# Patient Record
(demographics unavailable — no encounter records)

---

## 2017-11-30 NOTE — RADRPT
EXAM DATE/TIME:  11/30/2017 17:14 

 

[facility] COMPARISON:     

No previous studies available for comparison.

 

                     

INDICATIONS :     

Chest pain

                     

 

MEDICAL HISTORY :     

None.          

 

SURGICAL HISTORY :     

None.   

 

ENCOUNTER:     

Initial                                        

 

ACUITY:     

3 days      

 

PAIN SCORE:     

0/10

 

LOCATION:      

chest 

 

FINDINGS:     

A single view of the chest demonstrates the lungs to be symmetrically aerated without evidence of mas
s, infiltrate or effusion.  The cardiomediastinal contours are unremarkable.  Osseous structures are 
intact.

 

CONCLUSION:     No acute disease.  

 

 

 

 Palomo Pardo MD on November 30, 2017 at 17:29           

Board Certified Radiologist.

 This report was verified electronically.

## 2017-11-30 NOTE — PD
HPI


Chief Complaint:  Chest Pain


Time Seen by Provider:  17:01


Travel History


International Travel<30 days:  No


Contact w/Intl Traveler<30days:  No


Traveled to known affect area:  No





History of Present Illness


HPI


Patient is a 20-year-old male who presents to emergency room for evaluation of 

chest pain.  Patient reports that he has been having intermittent episodes of 

chest pain which has been ongoing for the past few months, patient reports that 

when he has these symptoms, chest pain is located under his left breast, 

reports that symptoms feel like sharp and stabbing in nature.  Patient with no 

diaphoresis or shortness of breath with symptoms.  Patient reports that nothing 

makes pain better or worse.  Patient reports that he currently does not have 

any chest pain, reports that his girlfriend made him come to the emergency room 

for evaluation.  Patient denies any history of hypertension, hyperlipidemia, 

coronary artery disease or diabetes.  He is a nonsmoker.  Denies use of drugs 

or alcohol.  Patient reports that he feels fine at this time, reports no chest 

pain but just wanted to be evaluated.





PFSH


Past Medical History


Asthma:  Yes


Diminished Hearing:  No


Respiratory:  Yes (asthma as child)


Tetanus Vaccination:  < 5 Years


Influenza Vaccination:  No





Past Surgical History


Surgical History:  No Previous Surgery





Social History


Alcohol Use:  No


Tobacco Use:  No


Substance Use:  No





Allergies-Medications


(Allergen,Severity, Reaction):  


Coded Allergies:  


     amoxicillin (Verified  Allergy, Unknown, swollen, 11/30/17)


     clavulanic acid (Verified  Allergy, Unknown, swollen, 11/30/17)


Reported Meds & Prescriptions





Reported Meds & Active Scripts


Active


No Active Prescriptions or Reported Medications    








Review of Systems


General / Constitutional:  No: Fever


Eyes:  No: Visual changes


HENT:  No: Headaches


Cardiovascular:  Positive: Chest Pain or Discomfort, No: Palpitations, 

Irregular Rhythm, Tachycardia, Diaphoresis, Syncope, Dyspnea on exertion, 

Varicosities, Edema, Cyanosis


Respiratory:  No: Shortness of Breath


Gastrointestinal:  No: Abdominal Pain


Genitourinary:  No: Dysuria


Musculoskeletal:  No: Pain


Skin:  No Rash


Neurologic:  No: Weakness


Psychiatric:  No: Depression


Endocrine:  No: Polydipsia


Hematologic/Lymphatic:  No: Easy Bruising





Physical Exam


Narrative


GENERAL: NAD


SKIN: Focused skin assessment warm/dry.


HEAD: Atraumatic. Normocephalic. 


EYES: Pupils equal and round. No scleral icterus. No injection or drainage. 


ENT: No nasal bleeding or discharge.  Mucous membranes pink and moist.


NECK: Trachea midline. No JVD. 


CARDIOVASCULAR: Regular rate and rhythm.  No murmur appreciated.


RESPIRATORY: No accessory muscle use. Clear to auscultation. Breath sounds 

equal bilaterally. 


GASTROINTESTINAL: Abdomen soft, non-tender, nondistended. Hepatic and splenic 

margins not palpable. 


MUSCULOSKELETAL: No obvious deformities. No clubbing.  No cyanosis.  No edema. 


NEUROLOGICAL: Awake and alert. No obvious cranial nerve deficits.  Motor 

grossly within normal limits. Normal speech.


PSYCHIATRIC: Appropriate mood and affect; insight and judgment normal.





Data


Data


Last Documented VS





Vital Signs








  Date Time  Temp Pulse Resp B/P (MAP) Pulse Ox O2 Delivery O2 Flow Rate FiO2


 


11/30/17 19:15  59 18 118/70 (86) 100 Room Air  


 


11/30/17 16:18 97.7       








Orders





 Orders


Electrocardiogram (11/30/17 )


Basic Metabolic Panel (Bmp) (11/30/17 17:10)


Ckmb (Isoenzyme) Profile (11/30/17 17:10)


Complete Blood Count With Diff (11/30/17 17:10)


D-Dimer (11/30/17 17:10)


Troponin I (11/30/17 17:10)


Chest, Single Ap (11/30/17 17:10)


Ecg Monitoring (11/30/17 17:10)


Iv Access Insert/Monitor (11/30/17 17:10)


Oximetry (11/30/17 17:10)


Sodium Chloride 0.9% Flush (Ns Flush) (11/30/17 17:15)


Drug Screen, Random Urine (11/30/17 17:10)


Electrocardiogram (11/30/17 )


Sodium Chlor 0.9% 1000 Ml Inj (Ns 1000 M (11/30/17 18:00)





Labs





Laboratory Tests








Test


  11/30/17


17:25


 


White Blood Count 7.3 TH/MM3 


 


Red Blood Count 5.06 MIL/MM3 


 


Hemoglobin 16.4 GM/DL 


 


Hematocrit 45.5 % 


 


Mean Corpuscular Volume 89.8 FL 


 


Mean Corpuscular Hemoglobin 32.3 PG 


 


Mean Corpuscular Hemoglobin


Concent 36.0 % 


 


 


Red Cell Distribution Width 12.3 % 


 


Platelet Count 188 TH/MM3 


 


Mean Platelet Volume 9.0 FL 


 


Neutrophils (%) (Auto) 61.6 % 


 


Lymphocytes (%) (Auto) 26.2 % 


 


Monocytes (%) (Auto) 9.5 % 


 


Eosinophils (%) (Auto) 1.8 % 


 


Basophils (%) (Auto) 0.9 % 


 


Neutrophils # (Auto) 4.5 TH/MM3 


 


Lymphocytes # (Auto) 1.9 TH/MM3 


 


Monocytes # (Auto) 0.7 TH/MM3 


 


Eosinophils # (Auto) 0.1 TH/MM3 


 


Basophils # (Auto) 0.1 TH/MM3 


 


CBC Comment AUTO DIFF 


 


Differential Comment


  AUTO DIFF


CONFIRMED


 


D-Dimer Quantitative (PE/DVT)


  LESS THAN 0.19


MG/L FEU


 


Blood Urea Nitrogen 12 MG/DL 


 


Creatinine 0.96 MG/DL 


 


Random Glucose 79 MG/DL 


 


Calcium Level 9.2 MG/DL 


 


Sodium Level 140 MEQ/L 


 


Potassium Level 3.8 MEQ/L 


 


Chloride Level 105 MEQ/L 


 


Carbon Dioxide Level 29.3 MEQ/L 


 


Anion Gap 6 MEQ/L 


 


Estimat Glomerular Filtration


Rate 100 ML/MIN 


 


 


Total Creatine Kinase 48 U/L 


 


Troponin I


  LESS THAN 0.02


NG/ML











MDM


Medical Decision Making


Medical Screen Exam Complete:  Yes


Emergency Medical Condition:  Yes


Medical Record Reviewed:  Yes


Interpretation(s)


EKG at 1703: NSR at 60bpm, qt/qtc: 372/372, nonspecific t wave changes








Vital Signs








  Date Time  Temp Pulse Resp B/P (MAP) Pulse Ox O2 Delivery O2 Flow Rate FiO2


 


11/30/17 16:18 97.7 97 14 158/94 (115) 100   








Differential Diagnosis


ACS, arrhythmia, PE, electrolyte abnormality, pneumothorax


Narrative Course


20 -year-old male with no medical problems, presents to emergency room for 

evaluation of intermittent chest pain which has been ongoing for the past few 

months.  Patient at this time has no chest pain at this time, he has no 

complaints, reports that he is here for a general workup and to "make sure my 

heart is okay."





During the course of the patients emergency department visit, the patients 

history, examination, and differential diagnosis were reviewed with the 

patient. The patient was placed on a cardiac monitor with oximetry and frequent 

blood pressure monitoring. The patient had an IV access obtained and blood work 

sent for analysis. 





5:45pm: During IV access and blood draw, patient had an episode of near syncope

, RN reports that patient became diaphoretic and pale and clammy during the 

blood draw.  Patient was on a stretcher during this blood draw, reports "I get 

queazy every time I am around blood."  Patient reports that "I can't see my own 

blood and I always pass out when I get blood taken from me."  BS in the 70's. 

Patient with resolution of symptoms a few minutes are blood draw. 





The patients laboratory studies were reviewed and remarkable for:








Laboratory Tests








Test


  11/30/17


17:25


 


White Blood Count


  7.3 TH/MM3


(4.0-11.0)


 


Red Blood Count


  5.06 MIL/MM3


(4.50-5.90)


 


Hemoglobin


  16.4 GM/DL


(13.0-17.0)


 


Hematocrit


  45.5 %


(39.0-51.0)


 


Mean Corpuscular Volume


  89.8 FL


(80.0-100.0)


 


Mean Corpuscular Hemoglobin


  32.3 PG


(27.0-34.0)


 


Mean Corpuscular Hemoglobin


Concent 36.0 %


(32.0-36.0)


 


Red Cell Distribution Width


  12.3 %


(11.6-17.2)


 


Platelet Count


  188 TH/MM3


(150-450)


 


Mean Platelet Volume


  9.0 FL


(7.0-11.0)


 


Neutrophils (%) (Auto)


  61.6 %


(16.0-70.0)


 


Lymphocytes (%) (Auto)


  26.2 %


(9.0-44.0)


 


Monocytes (%) (Auto)


  9.5 %


(0.0-8.0)


 


Eosinophils (%) (Auto)


  1.8 %


(0.0-4.0)


 


Basophils (%) (Auto)


  0.9 %


(0.0-2.0)


 


Neutrophils # (Auto)


  4.5 TH/MM3


(1.8-7.7)


 


Lymphocytes # (Auto)


  1.9 TH/MM3


(1.0-4.8)


 


Monocytes # (Auto)


  0.7 TH/MM3


(0-0.9)


 


Eosinophils # (Auto)


  0.1 TH/MM3


(0-0.4)


 


Basophils # (Auto)


  0.1 TH/MM3


(0-0.2)


 


CBC Comment AUTO DIFF 


 


Differential Comment


  AUTO DIFF


CONFIRMED


 


D-Dimer Quantitative (PE/DVT)


  LESS THAN 0.19


MG/L FEU


 


Blood Urea Nitrogen


  12 MG/DL


(7-18)


 


Creatinine


  0.96 MG/DL


(0.60-1.30)


 


Random Glucose


  79 MG/DL


()


 


Calcium Level


  9.2 MG/DL


(8.5-10.1)


 


Sodium Level


  140 MEQ/L


(136-145)


 


Potassium Level


  3.8 MEQ/L


(3.5-5.1)


 


Chloride Level


  105 MEQ/L


()


 


Carbon Dioxide Level


  29.3 MEQ/L


(21.0-32.0)


 


Anion Gap 6 MEQ/L (5-15) 


 


Estimat Glomerular Filtration


Rate 100 ML/MIN


(>89)


 


Total Creatine Kinase


  48 U/L


()


 


Troponin I


  LESS THAN 0.02


NG/ML











Radiology studies were reviewed and remarkable for:








Last Impressions








Chest X-Ray 11/30/17 7990 Signed





Impressions: 





 Service Date/Time:  Thursday, November 30, 2017 17:14 - CONCLUSION: No acute 





 disease.       Palomo Pardo MD 











Patient understands has follow-up with cardiologist to obtain full cardiac 

workup including but not limited to cardiac echo as well as stress test.  

Patient is asymptomatic at this time, there is no reason for admission to 

hospital, plan to give him follow-up with cardiologist as outpatient.





Patient chest pain free throughout ER visit. Patient with no complaints. I 

reviewed all labs and studies with patient in detail.  He will follow up with 

cardiologist and will return to ER as needed





Diagnosis





 Primary Impression:  


 Chest pain


Referrals:  


Rodrigo Barron MD


Patient Instructions:  General Instructions





***Additional Instructions:  


Please follow up with your primary care doctor





Please follow up with cardiologist for definitive studies





Return to ER as needed or if symptoms return


***Med/Other Pt SpecificInfo:  Prescription(s) given


Scripts


No Active Prescriptions or Reported Meds


Disposition:  01 DISCHARGE HOME


Condition:  Stable











Sonya Gurrola DO Nov 30, 2017 17:18

## 2017-12-01 NOTE — EKG
Date Performed: 11/30/2017       Time Performed: 17:50:39

 

PTAGE:      20 years

 

EKG:      SINUS BRADYCARDIA WITH SINUS ARRHYTHMIA ABNORMAL QRS-T ANGLE Since previous tracing, no sig
nificant change noted ABNORMAL ECG

 

PREVIOUS TRACING       :    11/30/2017    17.03.24

 

DOCTOR:   Kenya Henderson  Interpretating Date/Time  12/01/2017 16:12:43

## 2017-12-01 NOTE — EKG
Date Performed: 11/30/2017       Time Performed: 17:03:24

 

PTAGE:      20 years

 

EKG:      Sinus rhythm 

 

 WITH SINUS ARRHYTHMIA NONSPECIFIC T-WAVE ABNORMALITY ABNORMAL ECG 

 

NO PREVIOUS TRACING            

 

DOCTOR:   Kenya Henderson  Interpretating Date/Time  12/01/2017 16:11:11